# Patient Record
Sex: MALE | Race: BLACK OR AFRICAN AMERICAN | NOT HISPANIC OR LATINO | URBAN - METROPOLITAN AREA
[De-identification: names, ages, dates, MRNs, and addresses within clinical notes are randomized per-mention and may not be internally consistent; named-entity substitution may affect disease eponyms.]

---

## 2020-11-27 ENCOUNTER — EMERGENCY (EMERGENCY)
Facility: HOSPITAL | Age: 40
LOS: 1 days | Discharge: HOME | End: 2020-11-27
Attending: EMERGENCY MEDICINE
Payer: MEDICAID

## 2020-11-27 VITALS — HEIGHT: 68 IN | WEIGHT: 210.1 LBS

## 2020-11-27 VITALS
SYSTOLIC BLOOD PRESSURE: 152 MMHG | OXYGEN SATURATION: 95 % | DIASTOLIC BLOOD PRESSURE: 91 MMHG | HEART RATE: 82 BPM | TEMPERATURE: 99 F | RESPIRATION RATE: 20 BRPM

## 2020-11-27 LAB — SARS-COV-2 RNA SPEC QL NAA+PROBE: SIGNIFICANT CHANGE UP

## 2020-11-27 PROCEDURE — U0003: CPT

## 2020-11-27 PROCEDURE — 99283 EMERGENCY DEPT VISIT LOW MDM: CPT

## 2020-11-27 NOTE — ED ADULT TRIAGE NOTE - CHIEF COMPLAINT QUOTE
Pt   reports to ED requesting COVID swab , denies any symptoms but report being in contact with some one who tested positive for COVID

## 2020-11-27 NOTE — ED PROVIDER NOTE - ATTENDING CONTRIBUTION TO CARE
HPI: 39yo male with no PMH presenting requesting COVID swab s/p positive exposure. No symptoms- no fevers/chills/cp/SOB.     PE:  Gen: NAD  Head: NCAT  HEENT: Oral mucosa moist, normal conjunctiva  CV:normal perfusion  Resp: no respiratory distress  Neuro: No focal neuro deficits  MSK: FROM all 4 extremities, no deformity  Skin: No rash, no bruising  Psych: Normal affect    MDM: Pt here for encounter for covid swab, will dc home. Isela Leiva DO     I performed a history and physical exam of the patient and discussed their management with the advanced care provider. I reviewed the advanced care provider's note and agree with the documented findings and plan of care. My medical decision making and objective findings are found above.

## 2020-11-27 NOTE — ED PROVIDER NOTE - PATIENT PORTAL LINK FT
You can access the FollowMyHealth Patient Portal offered by [facility] by registering at the following website: [email or web address]. By joining "[employer]’s FollowMyHealth portal, you will also be able to view your health information using other applications (apps) compatible with our system.

## 2020-11-27 NOTE — ED PROVIDER NOTE - OBJECTIVE STATEMENT
Pt is a 40y M presenting for covid swab s/p + exposure. Pt denies any symptoms at this time. Pt denies chest pain/ abd pain/ loss of taste or smell/ fever. No other complaints

## 2021-11-08 ENCOUNTER — EMERGENCY (EMERGENCY)
Facility: HOSPITAL | Age: 41
LOS: 1 days | Discharge: HOME | End: 2021-11-08
Attending: EMERGENCY MEDICINE
Payer: MEDICAID

## 2021-11-08 VITALS
DIASTOLIC BLOOD PRESSURE: 60 MMHG | HEART RATE: 65 BPM | OXYGEN SATURATION: 99 % | TEMPERATURE: 98 F | RESPIRATION RATE: 18 BRPM | SYSTOLIC BLOOD PRESSURE: 110 MMHG

## 2021-11-08 VITALS
SYSTOLIC BLOOD PRESSURE: 174 MMHG | RESPIRATION RATE: 18 BRPM | TEMPERATURE: 98 F | HEART RATE: 78 BPM | WEIGHT: 199.96 LBS | DIASTOLIC BLOOD PRESSURE: 122 MMHG | HEIGHT: 68 IN | OXYGEN SATURATION: 97 %

## 2021-11-08 LAB
ALBUMIN SERPL ELPH-MCNC: 4.8 G/DL — SIGNIFICANT CHANGE UP (ref 3.3–5.2)
ALP SERPL-CCNC: 87 U/L — SIGNIFICANT CHANGE UP (ref 40–120)
ALT FLD-CCNC: 22 U/L — SIGNIFICANT CHANGE UP
ANION GAP SERPL CALC-SCNC: 11 MMOL/L — SIGNIFICANT CHANGE UP (ref 5–17)
AST SERPL-CCNC: 20 U/L — SIGNIFICANT CHANGE UP
BASOPHILS # BLD AUTO: 0.02 K/UL — SIGNIFICANT CHANGE UP (ref 0–0.2)
BASOPHILS NFR BLD AUTO: 0.2 % — SIGNIFICANT CHANGE UP (ref 0–2)
BILIRUB SERPL-MCNC: 0.7 MG/DL — SIGNIFICANT CHANGE UP (ref 0.4–2)
BUN SERPL-MCNC: 8.6 MG/DL — SIGNIFICANT CHANGE UP (ref 8–20)
CALCIUM SERPL-MCNC: 9.1 MG/DL — SIGNIFICANT CHANGE UP (ref 8.6–10.2)
CHLORIDE SERPL-SCNC: 99 MMOL/L — SIGNIFICANT CHANGE UP (ref 98–107)
CO2 SERPL-SCNC: 27 MMOL/L — SIGNIFICANT CHANGE UP (ref 22–29)
CREAT SERPL-MCNC: 0.79 MG/DL — SIGNIFICANT CHANGE UP (ref 0.5–1.3)
EOSINOPHIL # BLD AUTO: 0.03 K/UL — SIGNIFICANT CHANGE UP (ref 0–0.5)
EOSINOPHIL NFR BLD AUTO: 0.3 % — SIGNIFICANT CHANGE UP (ref 0–6)
FLUAV AG NPH QL: SIGNIFICANT CHANGE UP
FLUBV AG NPH QL: SIGNIFICANT CHANGE UP
GLUCOSE SERPL-MCNC: 92 MG/DL — SIGNIFICANT CHANGE UP (ref 70–99)
HCT VFR BLD CALC: 45.2 % — SIGNIFICANT CHANGE UP (ref 39–50)
HGB BLD-MCNC: 15.9 G/DL — SIGNIFICANT CHANGE UP (ref 13–17)
HIV 1 & 2 AB SERPL IA.RAPID: SIGNIFICANT CHANGE UP
IMM GRANULOCYTES NFR BLD AUTO: 0.3 % — SIGNIFICANT CHANGE UP (ref 0–1.5)
LYMPHOCYTES # BLD AUTO: 1.84 K/UL — SIGNIFICANT CHANGE UP (ref 1–3.3)
LYMPHOCYTES # BLD AUTO: 18.3 % — SIGNIFICANT CHANGE UP (ref 13–44)
MCHC RBC-ENTMCNC: 33.1 PG — SIGNIFICANT CHANGE UP (ref 27–34)
MCHC RBC-ENTMCNC: 35.2 GM/DL — SIGNIFICANT CHANGE UP (ref 32–36)
MCV RBC AUTO: 94.2 FL — SIGNIFICANT CHANGE UP (ref 80–100)
MONOCYTES # BLD AUTO: 0.8 K/UL — SIGNIFICANT CHANGE UP (ref 0–0.9)
MONOCYTES NFR BLD AUTO: 7.9 % — SIGNIFICANT CHANGE UP (ref 2–14)
NEUTROPHILS # BLD AUTO: 7.36 K/UL — SIGNIFICANT CHANGE UP (ref 1.8–7.4)
NEUTROPHILS NFR BLD AUTO: 73 % — SIGNIFICANT CHANGE UP (ref 43–77)
NT-PROBNP SERPL-SCNC: 33 PG/ML — SIGNIFICANT CHANGE UP (ref 0–300)
PLATELET # BLD AUTO: 251 K/UL — SIGNIFICANT CHANGE UP (ref 150–400)
POTASSIUM SERPL-MCNC: 4.3 MMOL/L — SIGNIFICANT CHANGE UP (ref 3.5–5.3)
POTASSIUM SERPL-SCNC: 4.3 MMOL/L — SIGNIFICANT CHANGE UP (ref 3.5–5.3)
PROT SERPL-MCNC: 8.2 G/DL — SIGNIFICANT CHANGE UP (ref 6.6–8.7)
RBC # BLD: 4.8 M/UL — SIGNIFICANT CHANGE UP (ref 4.2–5.8)
RBC # FLD: 12.1 % — SIGNIFICANT CHANGE UP (ref 10.3–14.5)
RSV RNA NPH QL NAA+NON-PROBE: SIGNIFICANT CHANGE UP
SARS-COV-2 RNA SPEC QL NAA+PROBE: SIGNIFICANT CHANGE UP
SODIUM SERPL-SCNC: 137 MMOL/L — SIGNIFICANT CHANGE UP (ref 135–145)
WBC # BLD: 10.08 K/UL — SIGNIFICANT CHANGE UP (ref 3.8–10.5)
WBC # FLD AUTO: 10.08 K/UL — SIGNIFICANT CHANGE UP (ref 3.8–10.5)

## 2021-11-08 PROCEDURE — 93010 ELECTROCARDIOGRAM REPORT: CPT

## 2021-11-08 PROCEDURE — 71045 X-RAY EXAM CHEST 1 VIEW: CPT

## 2021-11-08 PROCEDURE — 71045 X-RAY EXAM CHEST 1 VIEW: CPT | Mod: 26

## 2021-11-08 PROCEDURE — 83880 ASSAY OF NATRIURETIC PEPTIDE: CPT

## 2021-11-08 PROCEDURE — 93005 ELECTROCARDIOGRAM TRACING: CPT

## 2021-11-08 PROCEDURE — 85025 COMPLETE CBC W/AUTO DIFF WBC: CPT

## 2021-11-08 PROCEDURE — 99283 EMERGENCY DEPT VISIT LOW MDM: CPT | Mod: 25

## 2021-11-08 PROCEDURE — 86703 HIV-1/HIV-2 1 RESULT ANTBDY: CPT

## 2021-11-08 PROCEDURE — 87637 SARSCOV2&INF A&B&RSV AMP PRB: CPT

## 2021-11-08 PROCEDURE — 99285 EMERGENCY DEPT VISIT HI MDM: CPT

## 2021-11-08 PROCEDURE — 80053 COMPREHEN METABOLIC PANEL: CPT

## 2021-11-08 PROCEDURE — 36415 COLL VENOUS BLD VENIPUNCTURE: CPT

## 2021-11-08 RX ADMIN — Medication 0.1 MILLIGRAM(S): at 13:21

## 2021-11-08 NOTE — ED PROVIDER NOTE - ENMT NEGATIVE STATEMENT, MLM
no ear pain. Nose: no nasal congestion and no nasal drainage. Mouth/Throat: no dysphagia, no hoarseness and no throat pain. Neck: no lumps, no pain, no stiffness and no swollen glands.

## 2021-11-08 NOTE — ED ADULT NURSE NOTE - OBJECTIVE STATEMENT
Pt c/o productive cough x 3-4 days.  States he feels like he has fluid in his lungs.  Pt reports that his mother with whom he lives in currently in the ER with similar symptoms.

## 2021-11-08 NOTE — ED PROVIDER NOTE - ATTENDING CONTRIBUTION TO CARE
40 yo male with pmhx of HTN, JOEL,  presents to ED with mildly productive cough x3-4 days. pt states that his chest feels congested, and at night  + coughing fits pe awake alert in nad heent ncat neck supple cor s1s2 lungs clear abd soft nontender neuro nonfocal ; dx cough xray

## 2021-11-08 NOTE — ED PROVIDER NOTE - PATIENT PORTAL LINK FT
You can access the FollowMyHealth Patient Portal offered by [facility] by registering at the following website: [email or web address]. By joining [employer]’s FollowMyHealth portal, you will also be able to view your health information using other applications (apps) compatible with our system.

## 2021-11-08 NOTE — ED PROVIDER NOTE - OBJECTIVE STATEMENT
42 yo male with pmhx of HTN, JOEL, and seasonal allergies presents to ED with mildly productive cough x3-4 days. pt states that his chest feels congested, and at night he goes into coughing fits where he experiences chest pain described as "tightness." pt endorses cough occasionally produces yellow/green mucus, notes some chills and night sweats experienced 2 days ago, however has not had them recently. reports taking Benadryl, Fluticasone, Ibuprofen and Mucinex for mild symptoms relief, feels he is getting better but wants to make sure. pt states mother is here with same symptoms for same duration. denies recent travel, went to [place] a week ago to visit family. Pt received Covid vaccine, no flu vaccine.  denies fever, headache, nausea, vomiting, throat pain, nasal congestion, palpitations, SOB, abdominal pain or diarrhea. 40 yo male with pmhx of HTN, JOEL, and seasonal allergies presents to ED with mildly productive cough x3-4 days. pt states that his chest feels congested, and at night he goes into coughing fits. pt endorses cough occasionally produces yellow/green mucus, notes some chills and night sweats experienced 2 days ago, however has not had them recently. reports taking Benadryl, Fluticasone, Ibuprofen and Mucinex for mild symptoms relief, feels he is getting better but wants to make sure. pt states mother is here with same symptoms for same duration. denies recent travel, went to [place] a week ago to visit family. Pt received Covid vaccine, no flu vaccine.  denies fever, headache, nausea, vomiting, throat pain, nasal congestion, palpitations, SOB, abdominal pain or diarrhea.

## 2021-11-08 NOTE — ED PROVIDER NOTE - NSFOLLOWUPCLINICS_GEN_ALL_ED_FT
[facility]  Cardiology  [address], [address]  [place] [address]  Phone: [phone]  Fax:

## 2021-11-08 NOTE — ED PROVIDER NOTE - CLINICAL SUMMARY MEDICAL DECISION MAKING FREE TEXT BOX
42 yo male with h/o HTN and JOEL presents with cough for 3-4 days with occasional production of yellow/green mucus. States OTC medications have been helping with symptoms, feels as if might be getter better but unsure and concerned that he is not. mother is here with similar symptoms. Elevated HTN, considered outpt  follow up with cardiologist. ordered imaging, EKG and viral swab.

## 2022-11-02 DIAGNOSIS — J30.9 ALLERGIC RHINITIS, UNSPECIFIED: ICD-10-CM

## 2022-11-02 PROBLEM — Z00.00 ENCOUNTER FOR PREVENTIVE HEALTH EXAMINATION: Status: ACTIVE | Noted: 2022-11-02

## 2022-11-17 DIAGNOSIS — H10.10 ACUTE ATOPIC CONJUNCTIVITIS, UNSPECIFIED EYE: ICD-10-CM

## 2022-11-21 ENCOUNTER — NON-APPOINTMENT (OUTPATIENT)
Age: 42
End: 2022-11-21

## 2023-03-02 RX ORDER — DIPHENHYDRAMINE HCL 25 MG/1
25 TABLET ORAL
Qty: 120 | Refills: 5 | Status: ACTIVE | COMMUNITY
Start: 2023-03-02 | End: 1900-01-01

## 2023-11-29 ENCOUNTER — NON-APPOINTMENT (OUTPATIENT)
Age: 43
End: 2023-11-29

## 2024-01-29 RX ORDER — FEXOFENADINE HYDROCHLORIDE 180 MG/1
180 TABLET ORAL
Qty: 30 | Refills: 11 | Status: ACTIVE | COMMUNITY
Start: 2022-11-02 | End: 1900-01-01

## 2024-01-29 RX ORDER — KETOTIFEN FUMARATE 0.25 MG/ML
0.03 SOLUTION/ DROPS OPHTHALMIC
Qty: 2 | Refills: 5 | Status: ACTIVE | COMMUNITY
Start: 2022-11-17 | End: 1900-01-01

## 2024-01-29 RX ORDER — FLUTICASONE PROPIONATE 50 UG/1
50 SPRAY, METERED NASAL TWICE DAILY
Qty: 1 | Refills: 10 | Status: ACTIVE | COMMUNITY
Start: 2023-03-02 | End: 1900-01-01

## 2024-03-08 RX ORDER — CAMPHOR 0.45 %
25 GEL (GRAM) TOPICAL EVERY 6 HOURS
Qty: 120 | Refills: 0 | Status: ACTIVE | COMMUNITY
Start: 2023-03-30 | End: 1900-01-01

## 2024-03-10 NOTE — ED PROVIDER NOTE - TOBACCO USE
No Unknown if ever smoked Mohs Method Verbiage: After tumor debulking, and with each stage, an incision was made into a layer of healthy appearing skin around the clinically apparent tissue following the standard Mohs approach.  The specimen was harvested as a microscopically controlled layer.  A map was prepared to correspond to the area of skin from which it was excised.  The tissue was inked, divided as necessary, and prepared for frozen section processing.

## 2024-04-09 ENCOUNTER — RX RENEWAL (OUTPATIENT)
Age: 44
End: 2024-04-09

## 2024-09-16 DIAGNOSIS — Z23 ENCOUNTER FOR IMMUNIZATION: ICD-10-CM

## 2024-10-17 ENCOUNTER — RX CHANGE (OUTPATIENT)
Age: 44
End: 2024-10-17

## 2024-10-17 RX ORDER — FEXOFENADINE HYDROCHLORIDE 180 MG/1
180 TABLET ORAL
Qty: 90 | Refills: 2 | Status: ACTIVE | COMMUNITY
Start: 1900-01-01 | End: 1900-01-01

## 2025-02-22 ENCOUNTER — RX RENEWAL (OUTPATIENT)
Age: 45
End: 2025-02-22

## 2025-03-20 ENCOUNTER — RX CHANGE (OUTPATIENT)
Age: 45
End: 2025-03-20

## 2025-03-20 RX ORDER — FLUTICASONE PROPIONATE 50 UG/1
50 SPRAY, METERED NASAL
Qty: 48 | Refills: 3 | Status: ACTIVE | COMMUNITY
Start: 1900-01-01 | End: 1900-01-01